# Patient Record
Sex: FEMALE | Race: WHITE | NOT HISPANIC OR LATINO
[De-identification: names, ages, dates, MRNs, and addresses within clinical notes are randomized per-mention and may not be internally consistent; named-entity substitution may affect disease eponyms.]

---

## 2019-06-27 PROBLEM — Z00.00 ENCOUNTER FOR PREVENTIVE HEALTH EXAMINATION: Status: ACTIVE | Noted: 2019-06-27

## 2019-07-17 ENCOUNTER — APPOINTMENT (OUTPATIENT)
Dept: VASCULAR SURGERY | Facility: CLINIC | Age: 69
End: 2019-07-17
Payer: MEDICARE

## 2019-07-17 VITALS
BODY MASS INDEX: 27.31 KG/M2 | SYSTOLIC BLOOD PRESSURE: 130 MMHG | WEIGHT: 160 LBS | HEIGHT: 64 IN | DIASTOLIC BLOOD PRESSURE: 80 MMHG

## 2019-07-17 DIAGNOSIS — I10 ESSENTIAL (PRIMARY) HYPERTENSION: ICD-10-CM

## 2019-07-17 DIAGNOSIS — Z87.39 PERSONAL HISTORY OF OTHER DISEASES OF THE MUSCULOSKELETAL SYSTEM AND CONNECTIVE TISSUE: ICD-10-CM

## 2019-07-17 DIAGNOSIS — M79.89 OTHER SPECIFIED SOFT TISSUE DISORDERS: ICD-10-CM

## 2019-07-17 DIAGNOSIS — T14.8XXA OTHER INJURY OF UNSPECIFIED BODY REGION, INITIAL ENCOUNTER: ICD-10-CM

## 2019-07-17 DIAGNOSIS — Z83.3 FAMILY HISTORY OF DIABETES MELLITUS: ICD-10-CM

## 2019-07-17 DIAGNOSIS — Z82.3 FAMILY HISTORY OF STROKE: ICD-10-CM

## 2019-07-17 PROCEDURE — 99203 OFFICE O/P NEW LOW 30 MIN: CPT

## 2019-07-17 RX ORDER — LOSARTAN POTASSIUM AND HYDROCHLOROTHIAZIDE 12.5; 1 MG/1; MG/1
100-12.5 TABLET ORAL
Refills: 0 | Status: ACTIVE | COMMUNITY

## 2019-07-17 NOTE — REVIEW OF SYSTEMS
[Limb Swelling] : limb swelling [Fever] : no fever [Chills] : no chills [Chest Pain] : no chest pain [Shortness Of Breath] : no shortness of breath [Abdominal Pain] : no abdominal pain [Vomiting] : no vomiting [Constipation] : no constipation [Diarrhea] : no diarrhea [Dizziness] : no dizziness

## 2019-07-17 NOTE — DATA REVIEWED
[FreeTextEntry1] : She had a left leg venous duplex in May 2019 at [facility] which was negative for DVT.

## 2019-07-17 NOTE — HISTORY OF PRESENT ILLNESS
[FreeTextEntry1] : 69 years old female with chronic left leg swelling for 10 years recently getting worse. No h/o DVT, no large varicose veins. Family h/o VV present. Has not used stockings. The leg swelling is uncomfortable. She [facility] for [facility]. \par \par Had transvaginal uterine and bladder prolapse repair with mesh and hysterectomy 10 years ago and says she has nerve damage in left leg since. Has numbness in left leg starting from hip down.

## 2019-07-17 NOTE — ASSESSMENT
[FreeTextEntry1] : 69 years old female with chronic left leg swelling for 10 years recently getting worse, with no DVT on recent venous duplex. Her differential include venous insufficiency due to saphenous reflux and possible iliac vein stenosis/occlusion secondary to pelvic surgery and mesh. \par \par Plan:\par -Compression stockings and leg elevation\par - F/u 1 month for venous duplex to evaluate for possible reflux and iliac vein stenosis\par \par All questions were answered

## 2019-07-17 NOTE — PHYSICAL EXAM
[Normal Breath Sounds] : Normal breath sounds [Normal Heart Sounds] : normal heart sounds [2+] : left 2+ [Ankle Swelling (On Exam)] : present [Ankle Swelling On The Left] : moderate [Varicose Veins Of Lower Extremities] : not present [] : of the left leg [Ankle Swelling On The Right] : mild [Abdomen Masses] : No abdominal masses [Abdomen Tenderness] : ~T ~M No abdominal tenderness [Abdominal Bruit] : no abdominal bruit  [Alert] : alert [Oriented to Person] : oriented to person [Oriented to Place] : oriented to place [FreeTextEntry1] : left leg swelling, no ulcers  [de-identified] : no abdominal scars

## 2019-07-17 NOTE — CONSULT LETTER
[Dear  ___] : Dear  [unfilled], [Consult Letter:] : I had the pleasure of evaluating your patient, [unfilled]. [Please see my note below.] : Please see my note below. [Consult Closing:] : Thank you very much for allowing me to participate in the care of this patient.  If you have any questions, please do not hesitate to contact me. [FreeTextEntry2] : Dear Dr. Eubanks

## 2019-08-21 ENCOUNTER — APPOINTMENT (OUTPATIENT)
Dept: VASCULAR SURGERY | Facility: CLINIC | Age: 69
End: 2019-08-21
Payer: MEDICARE

## 2019-08-21 VITALS — SYSTOLIC BLOOD PRESSURE: 125 MMHG | DIASTOLIC BLOOD PRESSURE: 75 MMHG

## 2019-08-21 PROCEDURE — 93970 EXTREMITY STUDY: CPT

## 2019-08-21 PROCEDURE — 99213 OFFICE O/P EST LOW 20 MIN: CPT

## 2019-08-21 NOTE — PHYSICAL EXAM
[Normal Breath Sounds] : Normal breath sounds [Normal Heart Sounds] : normal heart sounds [2+] : left 2+ [Ankle Swelling (On Exam)] : present [Ankle Swelling On The Left] : of the left ankle [Varicose Veins Of Lower Extremities] : not present [Ankle Swelling On The Right] : mild [] : of the left leg [Abdomen Masses] : No abdominal masses [Abdomen Tenderness] : ~T ~M No abdominal tenderness [Abdominal Bruit] : no abdominal bruit  [Alert] : alert [Oriented to Place] : oriented to place [Oriented to Person] : oriented to person [de-identified] : no abdominal scars  [FreeTextEntry1] : left leg swelling resolved, no ulcers

## 2019-08-21 NOTE — REVIEW OF SYSTEMS
[Fever] : no fever [Chills] : no chills [Shortness Of Breath] : no shortness of breath [Chest Pain] : no chest pain [Abdominal Pain] : no abdominal pain [Vomiting] : no vomiting [Constipation] : no constipation [Diarrhea] : no diarrhea [Dizziness] : no dizziness [Limb Swelling] : limb swelling

## 2019-08-21 NOTE — ASSESSMENT
[FreeTextEntry1] : 69 years old female with chronic left leg swelling for 10 years with no DVT, reflux or clear evidence of iliac vein stenosis on recent venous duplex. She could have possible iliac vein stenosis/occlusion secondary to pelvic surgery and mesh but the swelling is under control with stockings\par \par Plan:\par -Compression stockings and leg elevation\par - F/u 6 month\par - If symptoms persist, might benefit from left leg venogram \par \par All questions were answered

## 2019-08-21 NOTE — REASON FOR VISIT
[FreeTextEntry1] : Medina is being seen today for a follow up on swelling in both lower extremities, Patient states she has been using the compression stocking 4 day out of 7 swelling has gone down but now she feels some discomfort in both legs every day tingling feeling and feeling very heavy while at rest this is when is feels discomfort more

## 2019-08-21 NOTE — HISTORY OF PRESENT ILLNESS
[FreeTextEntry1] : 69 years old female with chronic left leg swelling for 10 years. I prescribed compression stockings and her swelling has improved significantly. No h/o DVT, no large varicose veins. Family h/o VV. She had a left leg venous duplex in May 2019 at [facility] which was negative for DVT. Comes for reflux studies today to rule out venous insufficiency or proximal iliac vein obstruction. \par \par Had transvaginal uterine and bladder prolapse repair with mesh and hysterectomy 10 years ago and says she has nerve damage in left leg since. Has numbness in left leg starting from hip down.

## 2019-08-21 NOTE — CONSULT LETTER
[Dear  ___] : Dear  [unfilled], [Consult Letter:] : I had the pleasure of evaluating your patient, [unfilled]. [Consult Closing:] : Thank you very much for allowing me to participate in the care of this patient.  If you have any questions, please do not hesitate to contact me. [Please see my note below.] : Please see my note below. [FreeTextEntry2] : Dear Dr. Eubanks

## 2019-08-21 NOTE — DATA REVIEWED
[FreeTextEntry1] : Venous duplex both legs to rule out reflux today showed no reflux or DVT in both legs. No evidence of stagnant flow in CFV's

## 2020-02-19 ENCOUNTER — APPOINTMENT (OUTPATIENT)
Dept: VASCULAR SURGERY | Facility: CLINIC | Age: 70
End: 2020-02-19
Payer: MEDICARE

## 2020-02-19 VITALS
WEIGHT: 165 LBS | BODY MASS INDEX: 28.17 KG/M2 | HEART RATE: 79 BPM | DIASTOLIC BLOOD PRESSURE: 80 MMHG | SYSTOLIC BLOOD PRESSURE: 130 MMHG | HEIGHT: 64 IN | OXYGEN SATURATION: 97 %

## 2020-02-19 PROCEDURE — 99213 OFFICE O/P EST LOW 20 MIN: CPT

## 2020-02-19 NOTE — ASSESSMENT
[FreeTextEntry1] : 69 years old female with chronic left leg swelling for 10 years with no DVT, reflux or clear evidence of iliac vein stenosis on recent venous duplex. Swelling under control with stockings\par \par Plan:\par -Compression stockings and leg elevation\par - F/u as needed\par \par All questions were answered

## 2020-02-19 NOTE — REVIEW OF SYSTEMS
[Negative] : Heme/Lymph [Fever] : no fever [Chills] : no chills [Chest Pain] : no chest pain [Shortness Of Breath] : no shortness of breath [Abdominal Pain] : no abdominal pain [Vomiting] : no vomiting [Constipation] : no constipation [Diarrhea] : no diarrhea [Limb Swelling] : limb swelling [Dizziness] : no dizziness

## 2020-02-19 NOTE — HISTORY OF PRESENT ILLNESS
[FreeTextEntry1] : 69 years old female with chronic left leg swelling for 10 years. I prescribed compression stockings and her swelling has improved significantly. No h/o DVT, no large varicose veins. Family h/o VV. She had a left leg venous duplex in May 2019 at [facility] which was negative for DVT and has no reflux or evidence of iliac vein obstruction on last duplex. \par \par Had transvaginal uterine and bladder prolapse repair with mesh and hysterectomy 10 years ago and says she has nerve damage in left leg since. Has numbness in left leg starting from hip down.

## 2020-02-19 NOTE — PHYSICAL EXAM
[Normal Breath Sounds] : Normal breath sounds [Normal Heart Sounds] : normal heart sounds [2+] : left 2+ [Ankle Swelling (On Exam)] : present [Ankle Swelling On The Left] : of the left ankle [Varicose Veins Of Lower Extremities] : not present [] : of the left leg [Ankle Swelling On The Right] : mild [Abdomen Masses] : No abdominal masses [Abdomen Tenderness] : ~T ~M No abdominal tenderness [Abdominal Bruit] : no abdominal bruit  [Alert] : alert [Oriented to Person] : oriented to person [Oriented to Place] : oriented to place [FreeTextEntry1] : left leg swelling resolved, no ulcers  [de-identified] : no abdominal scars

## 2024-01-18 ENCOUNTER — APPOINTMENT (OUTPATIENT)
Dept: NEUROLOGY | Facility: CLINIC | Age: 74
End: 2024-01-18
Payer: MEDICARE

## 2024-01-18 VITALS
SYSTOLIC BLOOD PRESSURE: 153 MMHG | HEIGHT: 65 IN | OXYGEN SATURATION: 100 % | DIASTOLIC BLOOD PRESSURE: 83 MMHG | HEART RATE: 68 BPM | BODY MASS INDEX: 25.83 KG/M2 | RESPIRATION RATE: 16 BRPM | WEIGHT: 155 LBS

## 2024-01-18 DIAGNOSIS — Z86.69 PERSONAL HISTORY OF OTHER DISEASES OF THE NERVOUS SYSTEM AND SENSE ORGANS: ICD-10-CM

## 2024-01-18 DIAGNOSIS — Z78.9 OTHER SPECIFIED HEALTH STATUS: ICD-10-CM

## 2024-01-18 DIAGNOSIS — Z86.39 PERSONAL HISTORY OF OTHER ENDOCRINE, NUTRITIONAL AND METABOLIC DISEASE: ICD-10-CM

## 2024-01-18 DIAGNOSIS — C80.0 DISSEMINATED MALIGNANT NEOPLASM, UNSPECIFIED: ICD-10-CM

## 2024-01-18 DIAGNOSIS — Z78.0 ASYMPTOMATIC MENOPAUSAL STATE: ICD-10-CM

## 2024-01-18 DIAGNOSIS — Z86.59 PERSONAL HISTORY OF OTHER MENTAL AND BEHAVIORAL DISORDERS: ICD-10-CM

## 2024-01-18 DIAGNOSIS — M19.90 UNSPECIFIED OSTEOARTHRITIS, UNSPECIFIED SITE: ICD-10-CM

## 2024-01-18 DIAGNOSIS — G47.30 SLEEP APNEA, UNSPECIFIED: ICD-10-CM

## 2024-01-18 PROCEDURE — 99204 OFFICE O/P NEW MOD 45 MIN: CPT

## 2024-01-18 RX ORDER — UBIDECARENONE/VIT E ACET 100MG-5
25 MCG CAPSULE ORAL
Refills: 0 | Status: ACTIVE | COMMUNITY

## 2024-01-18 RX ORDER — THIAMINE HCL 100 MG
TABLET ORAL
Refills: 0 | Status: ACTIVE | COMMUNITY

## 2024-01-18 NOTE — HISTORY OF PRESENT ILLNESS
[FreeTextEntry1] : She is referred for evaluation of slowly progressive symptoms in feet for a few years.  She developed abnormal sensation in the feet for quite a few years without particular inciting event. 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  The abnormal sensation affects both feet, more in left side, for which she projected to have an old nerve injury in the past when she had surgical treatment bladder prolapse and hysterectomy more than 10 years ago. She developed numbness of left lower extremity. She still has a constant numbness in left posterior thigh.  There are several symptoms affecting her feet. When she walked, sometimes, she feels as if walking on rock. The feet are wrapped by paper around them. The feet are numb and in the same time, more sensitive to noxious stimuli. At nighttime, the feet are easily irritated by anything touching the skin. There are intermittent pins and needles sensations, burning pain in the feet. She wears compression socks during the day about 3-4 years. The toes cramp up spontaneously. She has also noticed problem in keeping her balance. She developed dizziness from the poor balance. She underwent ENT evaluation last year and had gone for some balance exercise. The ENT work up included a brain MRI. Sometimes, she feels weak in both legs. She has to hold onto objects when she stands most of time. Lately, she saw a new podiatrist. Symptoms of toes have improved somewhat.  She also carries diagnosis of sciatica affecting both lower extremities, more often left side. Sometimes, when she bends over, it causes temporary relief of pain in the back.  She was diagnosed of carpal tunnel syndrome many years ago. She wore splint with improvement of symptoms. Lately, the numbness of hands recurred. She has difficulty using her hands at work and driving. She also endorses intermittent neck pain without radiation.  She did not have electrodiagnostic studies for years.  She feels foggy from poor balance and dizziness sometimes. Independently, she has brief episodes of confusion and disorientation. She has chronic insomnia from multiple reasons. She carries diagnosis of sleep apnea and treated accordingly.

## 2024-01-18 NOTE — DATA REVIEWED
[de-identified] : 12/16/22 reported no evidence of acoustic neuroma or other cerebellopontine angle mass; right frontal venous angioma.

## 2024-01-18 NOTE — PHYSICAL EXAM
[FreeTextEntry1] : Cervical spine: normal  Lumbar spine: normal, mild tenderness of left lateral hip region Straight leg raising: negative  Tinel signs:  Carpal tunnel- positive bilaterally Cubital tunnel- negative Guyon tunnel- negative Fibula head: none. Tarsal tunnel: positive bilaterally  Deformities: none  Other areas: lomas erythema, colder temperature in feet, trophic changes in distal lower extremities      [General Appearance - Alert] : alert [General Appearance - In No Acute Distress] : in no acute distress [General Appearance - Well Nourished] : well nourished [Oriented To Time, Place, And Person] : oriented to person, place, and time [Impaired Insight] : insight and judgment were intact [Affect] : the affect was normal [Person] : oriented to person [Place] : oriented to place [Time] : oriented to time [Short Term Intact] : short term memory intact [Remote Intact] : remote memory intact [Registration Intact] : recent registration memory intact [Span Intact] : the attention span was normal [Concentration Intact] : normal concentrating ability [Visual Intact] : visual attention was ~T not ~L decreased [Naming Objects] : no difficulty naming common objects [Repeating Phrases] : no difficulty repeating a phrase [Writing A Sentence] : no difficulty writing a sentence [Fluency] : fluency intact [Comprehension] : comprehension intact [Reading] : reading intact [Current Events] : adequate knowledge of current events [Past History] : adequate knowledge of personal past history [Vocabulary] : adequate range of vocabulary [Cranial Nerves Optic (II)] : visual acuity intact bilaterally,  visual fields full to confrontation, pupils equal round and reactive to light [Cranial Nerves Oculomotor (III)] : extraocular motion intact [Cranial Nerves Trigeminal (V)] : facial sensation intact symmetrically [Cranial Nerves Facial (VII)] : face symmetrical [Cranial Nerves Vestibulocochlear (VIII)] : hearing was intact bilaterally [Cranial Nerves Glossopharyngeal (IX)] : tongue and palate midline [Cranial Nerves Accessory (XI - Cranial And Spinal)] : head turning and shoulder shrug symmetric [Cranial Nerves Hypoglossal (XII)] : there was no tongue deviation with protrusion [Motor Tone] : muscle tone was normal in all four extremities [Motor Strength] : muscle strength was normal in all four extremities [No Muscle Atrophy] : normal bulk in all four extremities [Motor Handedness Right-Handed] : the patient is right hand dominant [Proprioception] : proprioception was intact [Romberg's Sign] : a positive Romberg's sign was present [Glove / Stocking] : decreased in a glove and stocking distribution [Vibration Decrease Arm / Hand Bilateral] : decreased in both arms and hands [Vibration Decrease Leg / Foot Both Ankles] : decreased at both ankles [Vibration Decrease Leg / Foot Toes Both Feet] : decreased at the toes of both feet  [Balance] : balance was intact [Limited Balance] : the patient's balance was impaired [Past-pointing] : there was no past-pointing [Tremor] : no tremor present [Dysdiadochokinesia Bilaterally] : not present [Coordination - Dysmetria Impaired Finger-to-Nose Bilateral] : not present [Coordination - Dysmetria Impaired Heel-to-Shin Bilateral] : not present [2+] : Brachioradialis left 2+ [3+] : Patella left 3+ [0] : Ankle jerk left 0 [Plantar Reflex Right Only] : normal on the right [Plantar Reflex Left Only] : normal on the left [___] : absent on the right [___] : absent on the left [Primitive Reflexes] : primitive reflexes were absent [Glabellar Reflex] : the glabellar reflex was absent [FreeTextEntry6] : mild left EHL weakness 5-/5, more obvious weakness in plantar flexion bilaterally 4+/5

## 2024-01-18 NOTE — DISCUSSION/SUMMARY
[FreeTextEntry1] : She has clinical evidence of multiple PNS dysfunction. She also has symptoms suggestive of episodic CNS dysfunction. Of note, she has venous angioma reported over right frontal region in prior brain MRI report. Will review images of brain MRI. Review details of blood work within the last few years. Proceed to have NCS/EMG of upper and lower extremities. EEG study.

## 2024-02-08 ENCOUNTER — APPOINTMENT (OUTPATIENT)
Dept: NEUROLOGY | Facility: CLINIC | Age: 74
End: 2024-02-08
Payer: MEDICARE

## 2024-02-08 PROCEDURE — 95816 EEG AWAKE AND DROWSY: CPT

## 2024-02-23 ENCOUNTER — OUTPATIENT (OUTPATIENT)
Dept: OUTPATIENT SERVICES | Facility: HOSPITAL | Age: 74
LOS: 1 days | Discharge: HOME | End: 2024-02-23
Payer: MEDICARE

## 2024-02-23 ENCOUNTER — TRANSCRIPTION ENCOUNTER (OUTPATIENT)
Age: 74
End: 2024-02-23

## 2024-02-23 VITALS
WEIGHT: 149.91 LBS | SYSTOLIC BLOOD PRESSURE: 183 MMHG | DIASTOLIC BLOOD PRESSURE: 78 MMHG | RESPIRATION RATE: 18 BRPM | HEIGHT: 64 IN | TEMPERATURE: 97 F | HEART RATE: 66 BPM | OXYGEN SATURATION: 100 %

## 2024-02-23 VITALS — HEART RATE: 75 BPM | SYSTOLIC BLOOD PRESSURE: 178 MMHG | RESPIRATION RATE: 17 BRPM

## 2024-02-23 DIAGNOSIS — Z86.69 PERSONAL HISTORY OF OTHER DISEASES OF THE NERVOUS SYSTEM AND SENSE ORGANS: Chronic | ICD-10-CM

## 2024-02-23 DIAGNOSIS — Z98.890 OTHER SPECIFIED POSTPROCEDURAL STATES: Chronic | ICD-10-CM

## 2024-02-23 DIAGNOSIS — H25.11 AGE-RELATED NUCLEAR CATARACT, RIGHT EYE: ICD-10-CM

## 2024-02-23 DIAGNOSIS — Z90.710 ACQUIRED ABSENCE OF BOTH CERVIX AND UTERUS: Chronic | ICD-10-CM

## 2024-02-23 PROCEDURE — V2632: CPT

## 2024-02-23 RX ORDER — LOSARTAN/HYDROCHLOROTHIAZIDE 100MG-25MG
1 TABLET ORAL
Refills: 0 | DISCHARGE

## 2024-02-23 NOTE — ASU PATIENT PROFILE, ADULT - FALL HARM RISK - HARM RISK INTERVENTIONS

## 2024-02-23 NOTE — ASU DISCHARGE PLAN (ADULT/PEDIATRIC) - NS MD DC FALL RISK RISK
For information on Fall & Injury Prevention, visit: [email or web address] OR  [email or web address] OR  https://www.cdc.gov/steadi/patient.html

## 2024-02-23 NOTE — ASU PATIENT PROFILE, ADULT - NSICDXPASTSURGICALHX_GEN_ALL_CORE_FT
PAST SURGICAL HISTORY:  History of bladder surgery     S/P hysterectomy     S/P lumpectomy, left breast

## 2024-02-27 DIAGNOSIS — Z90.710 ACQUIRED ABSENCE OF BOTH CERVIX AND UTERUS: ICD-10-CM

## 2024-02-27 DIAGNOSIS — H26.8 OTHER SPECIFIED CATARACT: ICD-10-CM

## 2024-02-27 DIAGNOSIS — I10 ESSENTIAL (PRIMARY) HYPERTENSION: ICD-10-CM

## 2024-03-13 PROBLEM — G62.9 POLYNEUROPATHY, UNSPECIFIED: Chronic | Status: ACTIVE | Noted: 2024-02-23

## 2024-03-13 PROBLEM — I10 ESSENTIAL (PRIMARY) HYPERTENSION: Chronic | Status: ACTIVE | Noted: 2024-02-23

## 2024-03-15 ENCOUNTER — APPOINTMENT (OUTPATIENT)
Dept: NEUROLOGY | Facility: CLINIC | Age: 74
End: 2024-03-15
Payer: MEDICARE

## 2024-03-15 VITALS
WEIGHT: 150 LBS | RESPIRATION RATE: 16 BRPM | HEIGHT: 65 IN | SYSTOLIC BLOOD PRESSURE: 129 MMHG | OXYGEN SATURATION: 97 % | BODY MASS INDEX: 24.99 KG/M2 | DIASTOLIC BLOOD PRESSURE: 80 MMHG | HEART RATE: 70 BPM

## 2024-03-15 DIAGNOSIS — D18.02 HEMANGIOMA OF INTRACRANIAL STRUCTURES: ICD-10-CM

## 2024-03-15 PROCEDURE — 99214 OFFICE O/P EST MOD 30 MIN: CPT

## 2024-03-15 PROCEDURE — G2211 COMPLEX E/M VISIT ADD ON: CPT

## 2024-03-15 NOTE — PROCEDURE
[FreeTextEntry1] : 12/16/22 brain: reported right frontal venous angioma. Images reviewed with patient. Opening MRI setting. She is not willing to have a repeat study for now. 2/8/24 EEG: left more than right frontal temporal slowing, epileptiform discharges left frontal temporal region.

## 2024-03-15 NOTE — DISCUSSION/SUMMARY
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 Will start low dosage of Keppra. Return for NCS/EMG study of upper and lower extremities in June.

## 2024-03-15 NOTE — PHYSICAL EXAM
[FreeTextEntry1] : Cervical spine: normal  Lumbar spine: normal, mild tenderness of left lateral hip region Straight leg raising: negative  Tinel signs:  Carpal tunnel- positive bilaterally Cubital tunnel- negative Guyon tunnel- negative Fibula head: none. Tarsal tunnel: positive bilaterally  Deformities: none  Other areas: lomas erythema, colder temperature in feet, trophic changes in distal lower extremities. Constitutional: alert, in no acute distress and well nourished. Psychiatric: oriented to person, place, and time, insight and judgment were intact and the affect was normal. Neurologic:   Orientation: oriented to person, oriented to place and oriented to time.   Memory: short term memory intact, remote memory intact and recent registration memory intact.   Attention: the attention span was normal, normal concentrating ability and visual attention was not decreased.   Language: no difficulty naming common objects, no difficulty repeating a phrase, no difficulty writing a sentence, fluency intact, comprehension intact and reading intact.   Fund of knowledge: displays adequate knowledge of current events, adequate knowledge of personal past history and adequate range of vocabulary.   Cranial Nerves: visual acuity intact bilaterally, visual fields full to confrontation, pupils equal round and reactive to light, extraocular motion intact, facial sensation intact symmetrically, face symmetrical, hearing was intact bilaterally, tongue and palate midline, head turning and shoulder shrug symmetric and there was no tongue deviation with protrusion.   Motor: muscle tone was normal in all four extremities, muscle strength was normal in all four extremities and normal bulk in all four extremities.   Motor Strength:. the patient is right hand dominant. mild left EHL weakness 5-/5, more obvious weakness in plantar flexion bilaterally 4+/5   Sensory exam: proprioception was intact . a positive Romberg's sign was present.   Light Touch: (decreased symmetrically in distal lower and upper extremities, up to mid-calves and mid-forearm region, superimposed patchy area of left posterior thigh. The toes felt weird when touch).   Pain/Temperature: (decreased symmetrically in distal lower and upper extremities, up to mid-calves and mid-forearm region).   Vibration:  Vibratory sensation was decreased in a glove and stocking distribution, decreased in both arms and hands, decreased at both ankles and decreased at the toes of both feet .   Coordination:. balance was intact. the patient's balance was impaired. there was no past-pointing. no tremor present. Dysdiadochokinesia was not present. Finger to nose dysmetria was not present. Heel-shin dysmetria was not present.   Deep tendon reflexes: Biceps right 2+. Biceps left 2+.  Triceps right 2+. Triceps left 2+.  Brachioradialis right 2+. Brachioradialis left 2+.  Patella right 3+. Patella left 3+.  Ankle jerk right 0. Ankle jerk left 0.   Plantar responses normal on the right, normal on the left.   Ankle Clonus absent on the right, absent on the left.    Superficial/Primitive Reflexes: primitive reflexes were absent and the glabellar reflex was absent. (medium based, difficulty in both heel and toe standing).

## 2024-03-15 NOTE — HISTORY OF PRESENT ILLNESS
[FreeTextEntry1] : Since last visit, she developed new symptoms of radiating pain from posterior neck region. She has also noticed some memory lapses.  The other constant and intermittent symptoms remain about the same.  She underwent EEG and brain MRI studies. EMG scheduled in June. Pending more ENT work up.  She was referred for evaluation of slowly progressive symptoms in feet for a few years.  She developed abnormal sensation in the feet for quite a few years without particular inciting event. 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  The abnormal sensation affected both feet, more in left side, for which she projected to have an old nerve injury in the past when she had surgical treatment bladder prolapse and hysterectomy more than 10 years ago. She developed numbness of left lower extremity. She still has a constant numbness in left posterior thigh.  There were several symptoms affecting her feet. When she walked, sometimes, she feels as if walking on rock. The feet are wrapped by paper around them. The feet were numb and in the same time, more sensitive to noxious stimuli. At nighttime, the feet were easily irritated by anything touching the skin. There were intermittent pins and needles sensations, burning pain in the feet. She wore compression socks during the day about 3-4 years. The toes cramped up spontaneously. She had also noticed problem in keeping her balance. She developed dizziness from the poor balance. She underwent ENT evaluation last year and had gone for some balance exercise. The ENT work up included a brain MRI. Sometimes, she felt weak in both legs. She had to hold onto objects when she stood most of time. Lately, she saw a new podiatrist. Symptoms of toes had improved somewhat.  She also carried diagnosis of sciatica affecting both lower extremities, more often left side. Sometimes, when she bended over, it caused temporary relief of pain in the back.  She was diagnosed of carpal tunnel syndrome many years ago. She wore splint with improvement of symptoms. Lately, the numbness of hands recurred. She had difficulty using her hands at work and driving. She also endorsed intermittent neck pain without radiation.  She did not have electrodiagnostic studies for years.  She felt foggy from poor balance and dizziness sometimes. Independently, she had brief episodes of confusion and disorientation. She had chronic insomnia from multiple reasons. She carried diagnosis of sleep apnea and treated accordingly.

## 2024-06-21 ENCOUNTER — APPOINTMENT (OUTPATIENT)
Dept: NEUROLOGY | Facility: CLINIC | Age: 74
End: 2024-06-21
Payer: MEDICARE

## 2024-06-21 VITALS
HEIGHT: 65 IN | SYSTOLIC BLOOD PRESSURE: 130 MMHG | DIASTOLIC BLOOD PRESSURE: 73 MMHG | OXYGEN SATURATION: 96 % | HEART RATE: 70 BPM | WEIGHT: 155 LBS | BODY MASS INDEX: 25.83 KG/M2 | RESPIRATION RATE: 17 BRPM

## 2024-06-21 DIAGNOSIS — G56.03 CARPAL TUNNEL SYNDROM,BILATERAL UPPER LIMBS: ICD-10-CM

## 2024-06-21 DIAGNOSIS — M54.17 RADICULOPATHY, LUMBOSACRAL REGION: ICD-10-CM

## 2024-06-21 DIAGNOSIS — R42 DIZZINESS AND GIDDINESS: ICD-10-CM

## 2024-06-21 DIAGNOSIS — G62.9 POLYNEUROPATHY, UNSPECIFIED: ICD-10-CM

## 2024-06-21 DIAGNOSIS — R41.0 DISORIENTATION, UNSPECIFIED: ICD-10-CM

## 2024-06-21 DIAGNOSIS — G57.53 TARSAL TUNNEL SYNDROME, BILATERAL LOWER LIMBS: ICD-10-CM

## 2024-06-21 DIAGNOSIS — Z86.59 PERSONAL HISTORY OF OTHER MENTAL AND BEHAVIORAL DISORDERS: ICD-10-CM

## 2024-06-21 DIAGNOSIS — G40.209 LOCALIZATION-RELATED (FOCAL) (PARTIAL) SYMPTOMATIC EPILEPSY AND EPILEPTIC SYNDROMES WITH COMPLEX PARTIAL SEIZURES, NOT INTRACTABLE, W/OUT STATUS EPILEPTICUS: ICD-10-CM

## 2024-06-21 DIAGNOSIS — M54.12 RADICULOPATHY, CERVICAL REGION: ICD-10-CM

## 2024-06-21 PROCEDURE — 95885 MUSC TST DONE W/NERV TST LIM: CPT | Mod: 59

## 2024-06-21 PROCEDURE — 99213 OFFICE O/P EST LOW 20 MIN: CPT | Mod: 25

## 2024-06-21 PROCEDURE — 95913 NRV CNDJ TEST 13/> STUDIES: CPT

## 2024-06-21 PROCEDURE — 95886 MUSC TEST DONE W/N TEST COMP: CPT

## 2024-06-21 RX ORDER — LEVETIRACETAM 250 MG/1
250 TABLET, FILM COATED ORAL TWICE DAILY
Qty: 180 | Refills: 3 | Status: DISCONTINUED | COMMUNITY
Start: 2024-03-15 | End: 2024-06-21

## 2024-06-21 RX ORDER — ALPRAZOLAM 1 MG/1
1 TABLET ORAL
Qty: 2 | Refills: 0 | Status: ACTIVE | COMMUNITY
Start: 2024-06-21 | End: 1900-01-01

## 2024-06-21 RX ORDER — LEVETIRACETAM 750 MG/1
750 TABLET, EXTENDED RELEASE ORAL
Qty: 90 | Refills: 1 | Status: ACTIVE | COMMUNITY
Start: 2024-06-21 | End: 1900-01-01

## 2024-06-21 NOTE — PHYSICAL EXAM
[FreeTextEntry1] : Cervical spine: normal Lumbar spine: normal, mild tenderness of left lateral hip region Straight leg raising: negative Tinel signs: Carpal tunnel- positive bilaterally Cubital tunnel- negative Guyon tunnel- negative Fibula head: none. Tarsal tunnel: positive bilaterally  Other areas: lomas erythema, colder temperature in feet, trophic changes in distal lower extremities.  Constitutional: alert, in no acute distress and well nourished. Psychiatric: oriented to person, place, and time, insight and judgment were intact and the affect was normal. Neurologic: Orientation: oriented to person, oriented to place and oriented to time. Memory: short term memory intact, remote memory intact and recent registration memory intact. Attention: the attention span was normal, normal concentrating ability and visual attention was not decreased. Language: no difficulty naming common objects, no difficulty repeating a phrase, no difficulty writing a sentence, fluency intact, comprehension intact and reading intact. Fund of knowledge: displays adequate knowledge of current events, adequate knowledge of personal past history and adequate range of vocabulary. Cranial Nerves: visual acuity intact bilaterally, visual fields full to confrontation, pupils equal round and reactive to light, extraocular motion intact, facial sensation intact symmetrically, face symmetrical, hearing was intact bilaterally, tongue and palate midline, head turning and shoulder shrug symmetric and there was no tongue deviation with protrusion. Motor: muscle tone was normal in all four extremities, muscle strength was normal in all four extremities and normal bulk in all four extremities. Motor Strength:. the patient is right hand dominant. mild left EHL weakness 5-/5, more obvious weakness in plantar flexion bilaterally 4+/5 Sensory exam: proprioception was intact. a positive Romberg's sign was present. Light Touch: (decreased symmetrically in distal lower and upper extremities, up to mid-calves and mid-forearm region, superimposed patchy area of left posterior thigh. The toes felt weird when touch). Pain/Temperature: (decreased symmetrically in distal lower and upper extremities, up to mid-calves and mid-forearm region). Vibration: Vibratory sensation was decreased in a glove and stocking distribution, decreased in both arms and hands, decreased at both ankles and decreased at the toes of both feet. Coordination:. balance was intact. the patient's balance was impaired. there was no past-pointing. no tremor present. Dysdiadochokinesia was not present. Finger to nose dysmetria was not present. Heel-shin dysmetria was not present. Deep tendon reflexes: Biceps right 2+. Biceps left 2+. Triceps right 2+. Triceps left 2+. Brachioradialis right 2+. Brachioradialis left 2+. Patella right 3+. Patella left 3+. Ankle jerk right 0. Ankle jerk left 0. Plantar responses normal on the right, normal on the left. Ankle Clonus absent on the right, absent on the left. Superficial/Primitive Reflexes: primitive reflexes were absent and the glabellar reflex was absent. (medium based, difficulty in both heel and toe standing).  Procedure     12/16/22 brain: reported right frontal venous angioma. Images reviewed with patient. Opening MRI setting. She is not willing to have a repeat study for now. 2/8/24 EEG: left more than right frontal temporal slowing, epileptiform discharges left frontal temporal region.

## 2024-06-21 NOTE — DISCUSSION/SUMMARY
[FreeTextEntry1] : Start screening blood work to discern etiology of polyneuropathy. Add CPK due to suspicious myopathic dysfunction during needle EMG study. Proceed with cervical and lumbar spine MRI. Increase Keppra to 750mg/day. Use ER to minimize side effects. Alprazolam for claustrophobia.

## 2024-06-21 NOTE — HISTORY OF PRESENT ILLNESS
[FreeTextEntry1] : Since last visit, he neck pain with radiation to right upper extremity has gotten worse.  However, the introduction of low dosage of Keppra has helped her balance to certain degree. She has less dizziness, episodes of confusion and disorientation. She has mild side effect of drowsiness with day time dosage of Keppra.  She has not returned to ENT due to improvement of dizziness.  She was referred for evaluation of slowly progressive symptoms in feet for a few years initially.  She developed abnormal sensation in the feet for quite a few years without particular inciting event. 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  The abnormal sensation affected both feet, more in left side, for which she projected to have an old nerve injury in the past when she had surgical treatment bladder prolapse and hysterectomy more than 10 years ago. She developed numbness of left lower extremity. She still has a constant numbness in left posterior thigh.  There were several symptoms affecting her feet. When she walked, sometimes, she feels as if walking on rock. The feet are wrapped by paper around them. The feet were numb and at the same time, more sensitive to noxious stimuli. At nighttime, the feet were easily irritated by anything touching the skin. There were intermittent pins and needles sensations, burning pain in the feet. She wore compression socks during the day for about 3-4 years. The toes cramped up spontaneously. She had also noticed problem in keeping her balance. She developed dizziness from the poor balance. She underwent ENT evaluation and had gone for some balance exercise. The ENT work up included a brain MRI. Sometimes, she felt weak in both legs. She had to hold onto objects when she stood most of time. Lately, she saw a new podiatrist. Symptoms of toes had improved somewhat.  She also carried diagnosis of sciatica affecting both lower extremities, more often left side. Sometimes, when she bended over, it caused temporary relief of pain in the back.  She was diagnosed of carpal tunnel syndrome many years ago. She wore splint with improvement of symptoms. Lately, the numbness of hands recurred. She had difficulty using her hands at work and driving, mostly in right side.  She felt foggy from poor balance and dizziness sometimes. Independently, she had brief episodes of confusion and disorientation. She had chronic insomnia from multiple reasons. She carried diagnosis of sleep apnea and treated accordingly.

## 2024-06-21 NOTE — PROCEDURE
[FreeTextEntry1] : 6/21/24 NCS/EMG: moderate distal axonal sensory motor polyneuropathy; right C5, C8 and T1 radiculopathy; bilateral multilevel lumbosacral radiculopathy; right more than left CTS.

## 2024-08-02 ENCOUNTER — APPOINTMENT (OUTPATIENT)
Dept: UROGYNECOLOGY | Facility: CLINIC | Age: 74
End: 2024-08-02
Payer: MEDICARE

## 2024-08-02 VITALS
HEIGHT: 65 IN | WEIGHT: 155 LBS | BODY MASS INDEX: 25.83 KG/M2 | DIASTOLIC BLOOD PRESSURE: 89 MMHG | HEART RATE: 74 BPM | SYSTOLIC BLOOD PRESSURE: 158 MMHG

## 2024-08-02 DIAGNOSIS — N81.9 FEMALE GENITAL PROLAPSE, UNSPECIFIED: ICD-10-CM

## 2024-08-02 DIAGNOSIS — R39.15 URGENCY OF URINATION: ICD-10-CM

## 2024-08-02 DIAGNOSIS — M19.90 UNSPECIFIED OSTEOARTHRITIS, UNSPECIFIED SITE: ICD-10-CM

## 2024-08-02 DIAGNOSIS — N95.2 POSTMENOPAUSAL ATROPHIC VAGINITIS: ICD-10-CM

## 2024-08-02 DIAGNOSIS — N81.11 CYSTOCELE, MIDLINE: ICD-10-CM

## 2024-08-02 DIAGNOSIS — T14.8XXA OTHER INJURY OF UNSPECIFIED BODY REGION, INITIAL ENCOUNTER: ICD-10-CM

## 2024-08-02 DIAGNOSIS — Z78.0 ASYMPTOMATIC MENOPAUSAL STATE: ICD-10-CM

## 2024-08-02 DIAGNOSIS — K59.00 CONSTIPATION, UNSPECIFIED: ICD-10-CM

## 2024-08-02 DIAGNOSIS — M79.89 OTHER SPECIFIED SOFT TISSUE DISORDERS: ICD-10-CM

## 2024-08-02 PROCEDURE — 99459 PELVIC EXAMINATION: CPT

## 2024-08-02 PROCEDURE — G2211 COMPLEX E/M VISIT ADD ON: CPT

## 2024-08-02 PROCEDURE — 99205 OFFICE O/P NEW HI 60 MIN: CPT | Mod: 25

## 2024-08-02 PROCEDURE — 51701 INSERT BLADDER CATHETER: CPT

## 2024-08-02 RX ORDER — ESTRADIOL 0.1 MG/G
0.1 CREAM VAGINAL
Qty: 1 | Refills: 3 | Status: ACTIVE | COMMUNITY
Start: 2024-08-02 | End: 1900-01-01

## 2024-08-02 NOTE — COUNSELING
[FreeTextEntry1] : We will notify you of the urine results if they are abnormal.  For 4-5 days, cut one item from the list out of your diet.  After 4-5 days, it it makes a difference, you have to decide if it is worth keeping it out of your diet to help with the urinary issues.  If it does not make a difference, you should add it back into your diet and remove another item for another 4-5 days.  Bowel recipe every morning for constipation control  Apply a pea size amount of the cream to the opening of the vagina every night for two weeks followed by three nights per week  Please call my office if you have any issues with the cost or side effects of the medication.  Please schedule a pessary fitting with my PA, Josephine

## 2024-08-02 NOTE — PHYSICAL EXAM
[Chaperone Present] : A chaperone was present in the examining room during all aspects of the physical examination [[id number]] : A chaperone was present during the pelvic exam. [FreeTextEntry2] : MAYO [FreeTextEntry1] : Void: 350 cc PVR: 120 cc (normal PVR for this volume voided is 157cc or less) Urethra was prepped in sterile fashion and then a sterile non- indwelling catheter (14F) was used by me to drain the bladder. The patient tolerated the procedure well. Indication: Urinary Urgency    GH: 5 PB: 3 TVL: 8 C: -5 D: N/A  Aa: +3 Ba: +3 Ap: -2 Bp: -2  normal perineal sensation normal perineal reflexes cough stress test - atrophy+ urethral caruncle+ bilateral levator ani spasm, + tenderness + cuff tenderness - 1/5 Kegel

## 2024-08-02 NOTE — HISTORY OF PRESENT ILLNESS
[FreeTextEntry1] : Pt with pelvic floor dysfunction here for urogynecologic evaluation. She describes:   Chief PFD: bulge  Pelvic organ prolapse: s/p vaginal hysterectomy/mesh placement for prolapse 2010 [facility], +bulge, used a pessary years ago and then it seemed better till 2 years ago, wants pessary management, denies splinting Stress urinary incontinence: denies Overactive bladder syndrome: hx of seizures, hx of sleep apnea, uses the mouth guard, +urgency, frequency, no incontinence, no glaucoma Voiding dysfunction: no Incomplete bladder emptying, no hesitancy  Lower urinary tract/vaginal symptoms: no recurrent UTIs per year, no hematuria, no dysuria, no bladder pain  Fecal incontinence: denies Defecatory dysfunction: Type I  Sexual dysfunction: not active Pelvic pain: denies Vaginal dryness denies  Her pelvic floor symptoms are significantly bothersome and negatively impacting her quality of life.

## 2024-08-02 NOTE — REASON FOR VISIT
[TextEntry] : Reason for visit: New Pt  Voids per day:  6-10  Voids per night:   3 Urge incontinence Yes  Stress incontinence: No Constipation: Yes  Fecal incontinence: No Vaginal bulge: yes

## 2024-08-02 NOTE — DISCUSSION/SUMMARY
[FreeTextEntry1] :  Cystocele/vault prolapse- The patient was counseled regarding the possible natural progression of prolapse and the clinical consequences of worsening prolapse. The stage and the location of the prolapse was reviewed with the patient. She was counseled regarding the management strategies including observation, pessary placement and surgery. The patient voiced understanding and agrees with pessary management. She will be scheduled for a pessary fitting.  Atrophic vaginitis- We reviewed the risks, benefits, alternatives and indications of local estrogen therapy. She does opt to begin this therapy. I have given her a prescription and specific instructions on how to use the estrogen cream, applied with a finger at a low dose for urogenital atrophy.  Urinary urgency- The pathophysiology of the above condition was discussed with the patient. The patient was given information and education on pelvic floor muscle exercises/rehabilitation, avoidance of dietary bladder irritants, and other strategies to improve bladder control, such as scheduled voiding. She was counseled regarding further management strategies for overactive bladder and urge incontinence including pelvic floor physical therapy, medications or surgical management. The patient voiced understanding and agrees with diet changes and constipation control. We will follow up on her urine tests.  Constipation- The patient was counseled about her defecatory dysfunction. We discussed the importance of fiber, hydration and exercise. She was given a handout with specific information about dietary fiber and ways to relieve constipation.

## 2024-08-02 NOTE — END OF VISIT
[TextEntry] : 60m E&M, >50% spent in direct face to face counseling/coordination of care cystocele, vault prolapse, urinary urgency, constipation, atrophic vaginitis. This excludes cath. All questions answered. Patient reassured.

## 2024-08-05 LAB — URINE CULTURE <10: NORMAL

## 2024-08-30 ENCOUNTER — APPOINTMENT (OUTPATIENT)
Dept: NEUROLOGY | Facility: CLINIC | Age: 74
End: 2024-08-30
Payer: MEDICARE

## 2024-08-30 VITALS
HEART RATE: 73 BPM | WEIGHT: 150 LBS | DIASTOLIC BLOOD PRESSURE: 79 MMHG | SYSTOLIC BLOOD PRESSURE: 134 MMHG | HEIGHT: 65 IN | RESPIRATION RATE: 16 BRPM | BODY MASS INDEX: 24.99 KG/M2 | OXYGEN SATURATION: 97 %

## 2024-08-30 DIAGNOSIS — G56.03 CARPAL TUNNEL SYNDROM,BILATERAL UPPER LIMBS: ICD-10-CM

## 2024-08-30 DIAGNOSIS — D18.02 HEMANGIOMA OF INTRACRANIAL STRUCTURES: ICD-10-CM

## 2024-08-30 DIAGNOSIS — M54.17 RADICULOPATHY, LUMBOSACRAL REGION: ICD-10-CM

## 2024-08-30 DIAGNOSIS — M54.12 RADICULOPATHY, CERVICAL REGION: ICD-10-CM

## 2024-08-30 DIAGNOSIS — G40.209 LOCALIZATION-RELATED (FOCAL) (PARTIAL) SYMPTOMATIC EPILEPSY AND EPILEPTIC SYNDROMES WITH COMPLEX PARTIAL SEIZURES, NOT INTRACTABLE, W/OUT STATUS EPILEPTICUS: ICD-10-CM

## 2024-08-30 DIAGNOSIS — G62.9 POLYNEUROPATHY, UNSPECIFIED: ICD-10-CM

## 2024-08-30 PROCEDURE — 99214 OFFICE O/P EST MOD 30 MIN: CPT

## 2024-08-30 PROCEDURE — G2211 COMPLEX E/M VISIT ADD ON: CPT

## 2024-08-30 NOTE — HISTORY OF PRESENT ILLNESS
[FreeTextEntry1] : Since last visit, he neck pain with radiation to right upper extremity is still present. Also persistent abnormal sensation and numbness in the feet. She wore compression socks during the day for about 3-4 years. The toes cramped up spontaneously. Sometimes, she felt weak in both legs. She had to hold onto objects when she stood most of time. Chronic low back pain with radiation to lower extremities. Sometimes, when she bended over, it caused temporary relief of pain in the back.  Higher dosage of Keppra has helped her balance even better. Good tolerance to ER nightly dosage. She sleeps better now. However, not ready to further increase dosage of medication. She has very few dizzy spells. Not taking over her activities during attacks, rare episodes of confusion and disorientation. Better balance control.  Upon usage of hands, she still has symptoms of CTS.

## 2024-08-30 NOTE — PHYSICAL EXAM
[FreeTextEntry1] : Cervical spine: normal Lumbar spine: normal, mild tenderness of left lateral hip region Straight leg raising: negative Tinel signs: Carpal tunnel- positive bilaterally Cubital tunnel- negative Guyon tunnel- negative Fibula head: none. Tarsal tunnel: positive bilaterally  Other areas: lomas erythema, colder temperature in feet, trophic changes in distal lower extremities.  Constitutional: alert, in no acute distress and well nourished. Psychiatric: oriented to person, place, and time, insight and judgment were intact and the affect was normal. Neurologic: Orientation: oriented to person, oriented to place and oriented to time. Memory: short term memory intact, remote memory intact and recent registration memory intact. Attention: the attention span was normal, normal concentrating ability and visual attention was not decreased. Language: no difficulty naming common objects, no difficulty repeating a phrase, no difficulty writing a sentence, fluency intact, comprehension intact and reading intact. Fund of knowledge: displays adequate knowledge of current events, adequate knowledge of personal past history and adequate range of vocabulary. Cranial Nerves: visual acuity intact bilaterally, visual fields full to confrontation, pupils equal round and reactive to light, extraocular motion intact, facial sensation intact symmetrically, face symmetrical, hearing was intact bilaterally, tongue and palate midline, head turning and shoulder shrug symmetric and there was no tongue deviation with protrusion. Motor: muscle tone was normal in all four extremities, muscle strength was normal in all four extremities and normal bulk in all four extremities. Motor Strength:. the patient is right hand dominant. mild left EHL weakness 5-/5, more obvious weakness in plantar flexion bilaterally 4+/5 Sensory exam: proprioception was intact. a positive Romberg's sign was present. Light Touch: (decreased symmetrically in distal lower and upper extremities, up to mid-calves and mid-forearm region, superimposed patchy area of left posterior thigh. The toes felt weird when touch). Pain/Temperature: (decreased symmetrically in distal lower and upper extremities, up to mid-calves and mid-forearm region). Vibration: Vibratory sensation was decreased in a glove and stocking distribution, decreased in both arms and hands, decreased at both ankles and decreased at the toes of both feet. Coordination:. balance was intact. the patient's balance was impaired. there was no past-pointing. no tremor present. Dysdiadochokinesia was not present. Finger to nose dysmetria was not present. Heel-shin dysmetria was not present. Deep tendon reflexes: Biceps right 2+. Biceps left 2+. Triceps right 2+. Triceps left 2+. Brachioradialis right 2+. Brachioradialis left 2+. Patella right 3+. Patella left 3+. Ankle jerk right 0. Ankle jerk left 0. Plantar responses normal on the right, normal on the left. Ankle Clonus absent on the right, absent on the left. Superficial/Primitive Reflexes: primitive reflexes were absent and the glabellar reflex was absent. (medium based, difficulty in both heel and toe standing).  Procedure  12/16/22 brain: reported right frontal venous angioma. Images reviewed with patient. Opening MRI setting. She is not willing to have a repeat study for now. 7/26/24 cervical and lumbar spine MRI: reports in chart, images reviewed. 2/8/24 EEG: left more than right frontal temporal slowing, epileptiform discharges left frontal temporal region. 6/21/24 NCS/EMG: moderate distal axonal sensory motor polyneuropathy; right C5, C8 and T1 radiculopathy; bilateral multilevel lumbosacral radiculopathy; right more than left CTS. 7/12/24 blood work: Na 130, Cl: 95. normal LFT, B12, negative JUAQUIN, RF, Lyme. Low gamma globulin, mild leukopenia.

## 2024-08-30 NOTE — DISCUSSION/SUMMARY
[FreeTextEntry1] : Symptoms of CPZ improved. Will continue on same dosage of Keppra ER and repeat level prior to follow up. Will also repeat EEG prior to follow up. Abnormal Immunoglobulin probably one of the causes of distal polyneuropathy. Ordered recommended lab and refer to hematologist. She is still symptomatic from the entrapment neuropathy and radiculopathy in both cervical and lumbosacral region. The findings of cervical and lumbar spine MRI are responsible for the radiculopathy. Will proceed with PT. She has upcoming appointment with Dr. Hdz. She will discuss with her regarding need of further work up of hyponatremia.

## 2024-09-20 ENCOUNTER — APPOINTMENT (OUTPATIENT)
Dept: UROGYNECOLOGY | Facility: CLINIC | Age: 74
End: 2024-09-20
Payer: MEDICARE

## 2024-09-20 VITALS
BODY MASS INDEX: 24.99 KG/M2 | HEIGHT: 65 IN | DIASTOLIC BLOOD PRESSURE: 74 MMHG | SYSTOLIC BLOOD PRESSURE: 136 MMHG | WEIGHT: 150 LBS | HEART RATE: 69 BPM

## 2024-09-20 DIAGNOSIS — N95.2 POSTMENOPAUSAL ATROPHIC VAGINITIS: ICD-10-CM

## 2024-09-20 DIAGNOSIS — N81.11 CYSTOCELE, MIDLINE: ICD-10-CM

## 2024-09-20 DIAGNOSIS — N81.9 FEMALE GENITAL PROLAPSE, UNSPECIFIED: ICD-10-CM

## 2024-09-20 PROCEDURE — 57160 INSERT PESSARY/OTHER DEVICE: CPT

## 2024-09-20 PROCEDURE — 99214 OFFICE O/P EST MOD 30 MIN: CPT | Mod: 25

## 2024-09-20 PROCEDURE — 99459 PELVIC EXAMINATION: CPT

## 2024-09-20 PROCEDURE — A4562: CPT

## 2024-09-20 RX ORDER — ESTRADIOL 0.1 MG/G
0.1 CREAM VAGINAL
Refills: 0 | Status: ACTIVE | COMMUNITY

## 2024-09-20 NOTE — COUNSELING
[FreeTextEntry1] : Please call the office if you have any issues with vaginal pain, vaginal bleeding, difficulty urinating or having a bowel movement or if the pessary falls out so that we can arrange another size pessary.  Please continue to use estrace cream as directed.   Please follow up for pessary maintenance/PVR check in 4 weeks with ROSALINA Burton

## 2024-09-20 NOTE — PHYSICAL EXAM
[Chaperone Present] : A chaperone was present in the examining room during all aspects of the physical examination [[id number]] : A chaperone was present during the pelvic exam. [FreeTextEntry2] : Nitza [No Acute Distress] : in no acute distress [Well developed] : well developed [Well Nourished] : ~L well nourished

## 2024-09-20 NOTE — HISTORY OF PRESENT ILLNESS
[FreeTextEntry1] : Patient is here for pessary fitting. GH:  TVL: Last seen 8/2/24 as a new patient for prolapse.    Requested operate report but only received a pathology report with operative findings listed: vaginal hysterectomy, sacrocolpopexy, enterocele repair, anterior repair with mesh insertion, paravaginal repair, cysto.   Pelvic organ prolapse: s/p vaginal hysterectomy/mesh placement for prolapse 2010 [facility], +bulge, used a pessary years ago and then it seemed better till 2 years ago, wants pessary management, denies splinting  hx of seizures, hx of sleep apnea, uses the mouth guard no glaucoma  Denies stress incontinence Not sexually active No history of incomplete bladder emptying without reduction. PVR: 120 (normal PVR for voided amount is 157 or less)  GH: 5 PB: 3 TVL: 8 C: -5 D: N/A Aa: +3 Ba: +3 Ap: -2 Bp: -2  Estrace cream Bowel recipe  Today patient states that she is bothered by the prolapse and would like to try pessary fitting. Denies bleeding or pain. Brought her old pessary with her ring and support # 4, that used to fit her weel but she had discharge frequently, she used to self maintain the pessary and clean it every 2 weeks. Pt was sexually active at that time but not currently.

## 2024-09-20 NOTE — END OF VISIT
[TextEntry] :  IJosephine PA-C, spent 30 minutes face to face with the patient. This excludes fitting

## 2024-09-20 NOTE — REASON FOR VISIT
[TextEntry] : Reason for visit: Pessary Fitting Voids per day:   6-10 Voids per night:   2-3 Urge incontinence: Yes Stress incontinence: No     Constipation: Yes    Fecal incontinence: No Vaginal bulge: yes

## 2024-09-20 NOTE — DISCUSSION/SUMMARY
[FreeTextEntry1] : Vaginal vault Prolapse/cystocele  Ring and support # 4 placed without difficulty. Remained in place with Valsalva, coughing, and ambulating. Fitter pessary removed.  New pessary inserted, Ring and support # 4 The patient tolerated all fittings well. Follow up in 4 weeks for pessary check/PVR check.

## 2024-11-07 ENCOUNTER — APPOINTMENT (OUTPATIENT)
Dept: UROGYNECOLOGY | Facility: CLINIC | Age: 74
End: 2024-11-07

## 2025-03-07 ENCOUNTER — APPOINTMENT (OUTPATIENT)
Dept: NEUROLOGY | Facility: CLINIC | Age: 75
End: 2025-03-07
Payer: MEDICARE

## 2025-03-07 PROCEDURE — 95816 EEG AWAKE AND DROWSY: CPT

## 2025-03-21 ENCOUNTER — APPOINTMENT (OUTPATIENT)
Dept: NEUROLOGY | Facility: CLINIC | Age: 75
End: 2025-03-21

## 2025-03-21 VITALS
WEIGHT: 147 LBS | BODY MASS INDEX: 24.79 KG/M2 | SYSTOLIC BLOOD PRESSURE: 130 MMHG | HEIGHT: 64.5 IN | HEART RATE: 65 BPM | DIASTOLIC BLOOD PRESSURE: 80 MMHG | OXYGEN SATURATION: 98 % | RESPIRATION RATE: 16 BRPM

## 2025-03-21 DIAGNOSIS — G40.209 LOCALIZATION-RELATED (FOCAL) (PARTIAL) SYMPTOMATIC EPILEPSY AND EPILEPTIC SYNDROMES WITH COMPLEX PARTIAL SEIZURES, NOT INTRACTABLE, W/OUT STATUS EPILEPTICUS: ICD-10-CM

## 2025-03-21 DIAGNOSIS — M54.12 RADICULOPATHY, CERVICAL REGION: ICD-10-CM

## 2025-03-21 DIAGNOSIS — M54.17 RADICULOPATHY, LUMBOSACRAL REGION: ICD-10-CM

## 2025-03-21 DIAGNOSIS — G56.03 CARPAL TUNNEL SYNDROM,BILATERAL UPPER LIMBS: ICD-10-CM

## 2025-03-21 DIAGNOSIS — G62.9 POLYNEUROPATHY, UNSPECIFIED: ICD-10-CM

## 2025-03-21 PROCEDURE — G2211 COMPLEX E/M VISIT ADD ON: CPT

## 2025-03-21 PROCEDURE — 99214 OFFICE O/P EST MOD 30 MIN: CPT
